# Patient Record
Sex: FEMALE | Race: BLACK OR AFRICAN AMERICAN
[De-identification: names, ages, dates, MRNs, and addresses within clinical notes are randomized per-mention and may not be internally consistent; named-entity substitution may affect disease eponyms.]

---

## 2021-05-16 ENCOUNTER — HOSPITAL ENCOUNTER (OUTPATIENT)
Dept: HOSPITAL 76 - DI.N | Age: 26
Discharge: HOME | End: 2021-05-16
Attending: FAMILY MEDICINE
Payer: COMMERCIAL

## 2021-05-16 DIAGNOSIS — M79.644: Primary | ICD-10-CM

## 2021-05-16 NOTE — XRAY REPORT
PROCEDURE:  Finger(s) RT

 

INDICATIONS:  R FINGER PX

 

TECHNIQUE:  AP hand, 3 views of the third finger(s) acquired.  

 

COMPARISON:  None

 

FINDINGS:  

 

Bones:  No fractures or dislocations.  No suspicious bony lesions.  

 

Soft tissues:  No suspicious soft tissue calcifications.  

 

IMPRESSION:  

No visualized acute fracture or dislocation. However, occult injury cannot be excluded. Recommend esther
rt interval imaging follow-up in 7-10 days as clinically indicated for additional evaluation.

 

Reviewed by: Bia Erickson MD on 5/16/2021 6:37 PM PDT

Approved by: Bia Erickson MD on 5/16/2021 6:37 PM PDT

 

 

Station ID:  [id number]

## 2021-05-20 ENCOUNTER — HOSPITAL ENCOUNTER (EMERGENCY)
Dept: HOSPITAL 76 - ED | Age: 26
Discharge: HOME | End: 2021-05-20
Payer: COMMERCIAL

## 2021-05-20 VITALS — SYSTOLIC BLOOD PRESSURE: 132 MMHG | DIASTOLIC BLOOD PRESSURE: 71 MMHG
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
## 2021-05-20 DIAGNOSIS — S67.192A: Primary | ICD-10-CM

## 2021-05-20 DIAGNOSIS — R73.03: ICD-10-CM
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
## 2021-05-20 DIAGNOSIS — L03.011: ICD-10-CM

## 2021-05-20 PROCEDURE — 99282 EMERGENCY DEPT VISIT SF MDM: CPT

## 2021-05-20 PROCEDURE — 99283 EMERGENCY DEPT VISIT LOW MDM: CPT

## 2021-05-20 PROCEDURE — 11740 EVACUATION SUBUNGUAL HMTMA: CPT

## 2021-05-20 NOTE — ED PHYSICIAN DOCUMENTATION
History of Present Illness





- Stated complaint


Stated Complaint: RT FINGER INJURY





- Chief complaint


Chief Complaint: Ext Problem





- Additonal information


Additional information: 


25-year-old right-handed female presents to the emergency department for 

evaluation of her right middle finger injury.  On the 14th of this month she was

running to catch her child and she fell forward jamming the finger on the 

ground.  She wears acrylic nails and had immediate swelling and pain of the 

finger.  2 days later she presented to an urgent care setting concerned that she

may have had bruising or bleeding under the nail.  However because she had 

acrylic nails they started her on antibiotics.  Despite the antibiotic she has 

had increased swelling of the distal fingertip.  Also increased pain but no 

fevers.  She is currently taking cephalexin this is day 3 of 7. An x-ray 

completed at the urgent care did not show any findings of fracture.








Review of Systems


Constitutional: reports: Reviewed and negative


Ears: reports: Reviewed and negative


Nose: reports: Reviewed and negative


Throat: reports: Reviewed and negative


Musculoskeletal: reports: Extremity pain (right middle finger)





PD PAST MEDICAL HISTORY





- Past Medical History


Past Medical History: Yes


Other Past Medical History: Pre-diabetes type 2





- Past Surgical History


Past Surgical History: Yes


Ortho: Knee replacement





- Present Medications


Home Medications: 


                                Ambulatory Orders











 Medication  Instructions  Recorded  Confirmed


 


HYDROcod/ACETAM 5/325 [Norco 5/325] 1 tablet PO BID PRN #5 tablet 05/20/21 














- Allergies


Allergies/Adverse Reactions: 


                                    Allergies











Allergy/AdvReac Type Severity Reaction Status Date / Time


 


No Known Drug Allergies Allergy   Verified 05/20/21 15:57














- Social History


Does the pt smoke?: No


Smoking Status: Never smoker


Does the pt drink ETOH?: No





- Immunizations


Immunizations are current?: Yes





PD ED PE EXPANDED





- General


General: Alert, No acute distress





- Extremities


Extremities: Right finger(s) (distal middle finger swelling.  native nail with 

serous foul smell fluid draining beneath.  crylic gel nal attached to native 

nail)





Results





- Vitals


Vitals: 





                               Vital Signs - 24 hr











  05/20/21





  15:53


 


Temperature 36.5 C


 


Heart Rate 86


 


Respiratory 16





Rate 


 


Blood Pressure 132/71 H


 


O2 Saturation 99








                                     Oxygen











O2 Source                      Room air

















Procedures





- General procedure


General procedure: 





1% lidocaine used to acheive adequate anesthesia of the finger via digital 

block.  Forceps were used to Lift the gel acrylic nail from the native nail bed.

Once the acrylic nail was lifted moderate amount of serous foul-smelling fluid 

draining tween the native nail and the nail bed.  The native nail was 

trephinated using a scalpel.





PD MEDICAL DECISION MAKING





- ED course


Complexity details: reviewed results, re-evaluated patient, d/w patient


ED course: 





25-year-old female presents emergency department for evaluation of a right 

middle finger nailbed injury sustained after she jammed her finger just about 1 

week ago.  She was started on antibiotics at an outpatient walk-in clinic 

unfortunately the infection has progressed.  She likely had a subungual hematoma

that has not had the ability to drain.  I was able to lift the acrylic nail from

the native nail bed at the bedside.  The native nail is somewhat boggy and soft 

but it was trephinated.  The nail was also irrigated copiously.  Patient is 

advised to do warm salt water soaks on this nail twice daily continue her 

antibiotics.  She is to return to the emergency department if she is having 

increased pain swelling redness or concerns of infection.





I am prescribing a short course of short-acting opioid pain medication for this 

patient. I have reviewed the patients  and no concerning findings were 

noted. I have discussed that the opioids are for short term therapy only, and 

will not be refilled from the ED.





Departure





- Departure


Disposition: 01 Home, Self Care


Clinical Impression: 


 Subungual hematoma, Finger infection





Condition: Stable


Record reviewed to determine appropriate education?: Yes


Prescriptions: 


HYDROcod/ACETAM 5/325 [Norco 5/325] 1 tablet PO BID PRN #5 tablet


 PRN Reason: Pain


Comments: 


Unfortunately when you jammed her finger you likely some sustained a large 

amount of bruising between your native nail and the nail bed.  This is called a 

subungual hematoma.  At the bedside today we were able to remove your acrylic 

nail and drain the fluid and infection that had developed beneath the native 

nail.





Please soak your finger in warm salt water twice a day.  Continue the 

antibiotics.  Now that the nail has had the opportunity to drain I do expect 

that the redness pain and swelling will be markedly better over the next 48 to 

72 hours.  If your symptoms are not improving, you have increased swelling 

fevers or pain please return to the ER for a second look.





I am prescribing a short course of narcotic pain medication for you. These are 

potentially dangerous and addictive medications that should be used carefully.


These medications may constipate you. Take an over-the-counter stool softener 

(docusate) twice daily with plenty of water while taking these medications. If 

you go 24 hours without a bowel movement, take over-the-counter miralax, per 

package instructions.


Do not drink or drive while taking these medications.


If you received narcotic or sedating medications while in the emergency 

department, do not drive for 24 hours.


Store this medication in a safe, secure place and out of reach of children.


It is a violation of federal law to give or sell this medication to another 

person or to use in a manner other than prescribed.


The ED will not refill narcotic prescriptions, including prescriptions lost or 

stolen.


To dispose of unwanted medications:


1. [facility] [address] in 

[place] has a medication drop box. They accept prescription medications (in 

pill form) Monday through Friday 9:00 a.m. to 5:00 p.m.


2. [facility] [employer] accepts prescription medications (in

pill form only) for disposal year round. Call [phone] for more 

information.


3. Contact the [facility] for the next [facility] sponsored prescription 

drug collection event. [phone], [phone], or [phone] 

[phone];


Note that many narcotic pain relievers also contain Tylenol/acetaminophen.  

Please ensure that your total dose of acetaminophen from all sources does not 

exceed 3 g (3000 mg) per day.

## 2022-02-02 ENCOUNTER — HOSPITAL ENCOUNTER (OUTPATIENT)
Dept: HOSPITAL 76 - LAB.N | Age: 27
End: 2022-02-02
Attending: PHYSICIAN ASSISTANT
Payer: COMMERCIAL

## 2022-02-02 DIAGNOSIS — R19.7: Primary | ICD-10-CM

## 2023-08-07 ENCOUNTER — HOSPITAL ENCOUNTER (EMERGENCY)
Dept: HOSPITAL 76 - ED | Age: 28
Discharge: HOME | End: 2023-08-07
Payer: COMMERCIAL

## 2023-08-07 VITALS — SYSTOLIC BLOOD PRESSURE: 139 MMHG | DIASTOLIC BLOOD PRESSURE: 84 MMHG | OXYGEN SATURATION: 96 %
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
## 2023-08-07 DIAGNOSIS — M54.2: Primary | ICD-10-CM
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
## 2023-08-07 PROCEDURE — 99282 EMERGENCY DEPT VISIT SF MDM: CPT

## 2023-08-07 PROCEDURE — 99283 EMERGENCY DEPT VISIT LOW MDM: CPT
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
